# Patient Record
Sex: FEMALE | Race: WHITE
[De-identification: names, ages, dates, MRNs, and addresses within clinical notes are randomized per-mention and may not be internally consistent; named-entity substitution may affect disease eponyms.]

---

## 2019-01-21 ENCOUNTER — HOSPITAL ENCOUNTER (OUTPATIENT)
Dept: HOSPITAL 80 - FIMAGING | Age: 59
Discharge: HOME | End: 2019-01-21
Attending: SPECIALIST
Payer: COMMERCIAL

## 2019-01-21 VITALS — SYSTOLIC BLOOD PRESSURE: 117 MMHG | DIASTOLIC BLOOD PRESSURE: 58 MMHG

## 2019-01-21 DIAGNOSIS — C78.7: Primary | ICD-10-CM

## 2019-01-21 LAB
INR PPP: 1.16 (ref 0.83–1.16)
PLATELET # BLD: 188 10^3/UL (ref 150–400)
PROTHROMBIN TIME: 15 SEC (ref 12–15)

## 2019-01-21 PROCEDURE — 0FB23ZX EXCISION OF LEFT LOBE LIVER, PERCUTANEOUS APPROACH, DIAGNOSTIC: ICD-10-PCS | Performed by: GENERAL ACUTE CARE HOSPITAL

## 2019-01-21 NOTE — PDPROPOC
Sedation Plan of Care


ASA Classification: ASA 2


Mallampati Score: Class 2


Mallampati Reference Image:

## 2019-01-21 NOTE — PDRADPRE
Radiology History & Physical


Indication for procedure: liver disease


Home medications: 











Paxil 20 mg PO DAILY 01/18/19 [Last Taken Unknown]





Allergies/Adverse Reactions: 








No Known Allergies Allergy (Unverified 01/18/19 11:03)


 





Mental status: A&Ox3

## 2019-01-21 NOTE — PDRADPN
Radiology Procedure Note


Date of Procedure: 01/21/19


Radiologist: Allegra Kramer


Anesthesia: IV Sedation


Pre-op Diagnosis: liver masses


Post-op Diagnosis: same


Procedure: us guided biopsy


Inf/Abcess present in the surg proc area at time of surgery?: No

## 2019-06-10 ENCOUNTER — HOSPITAL ENCOUNTER (INPATIENT)
Dept: HOSPITAL 80 - F2W | Age: 59
LOS: 13 days | Discharge: HOME | End: 2019-06-23
Payer: COMMERCIAL

## 2019-06-25 ENCOUNTER — HOSPITAL ENCOUNTER (OUTPATIENT)
Dept: HOSPITAL 80 - FIMAGING | Age: 59
End: 2019-06-25
Payer: COMMERCIAL